# Patient Record
(demographics unavailable — no encounter records)

---

## 2025-04-14 NOTE — HISTORY OF PRESENT ILLNESS
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  NO treatment to date and no prior Hx. This came on atraumatically.

## 2025-04-14 NOTE — DISCUSSION/SUMMARY
[de-identified] : Right shoulder calcific tendonitis and impingement in [facility] Radiologist Plan:  SA injection  PT  F/U 8 weeks

## 2025-04-14 NOTE — PHYSICAL EXAM
[de-identified] : Right shoulder positive Neer ROM  ER 90 IR T12 Negative Hawk  SS IS and subscap all 5/5 to isometric testing  Very painful positive Nada's sign  [de-identified] : 4 views of right shoulder show large clacific deposit in the RC.  Midl thinning of GH Joint.  Type 1 Acromion and no head elevation

## 2025-04-14 NOTE — PROCEDURE
[de-identified] : The right shoulder was prepped with alcohol and ethyl chloride was used to numb the skin. A 6 cc injection with 3 cc xylocaine, 2 cc sensoricaine and 1 cc kenalog was given without complication into the Sub acromial space. Patient instructed that there may be an inflammatory flare for 24 hrs , to use ice or advil if needed